# Patient Record
Sex: FEMALE | Race: OTHER
[De-identification: names, ages, dates, MRNs, and addresses within clinical notes are randomized per-mention and may not be internally consistent; named-entity substitution may affect disease eponyms.]

---

## 2020-01-16 ENCOUNTER — HOSPITAL ENCOUNTER (EMERGENCY)
Dept: HOSPITAL 62 - ER | Age: 25
LOS: 1 days | Discharge: HOME | End: 2020-01-17
Payer: MEDICAID

## 2020-01-16 DIAGNOSIS — Z83.3: ICD-10-CM

## 2020-01-16 DIAGNOSIS — E86.0: ICD-10-CM

## 2020-01-16 DIAGNOSIS — I10: Primary | ICD-10-CM

## 2020-01-16 DIAGNOSIS — R73.9: ICD-10-CM

## 2020-01-16 LAB
ADD MANUAL DIFF: NO
ALBUMIN SERPL-MCNC: 4.9 G/DL (ref 3.5–5)
ALP SERPL-CCNC: 75 U/L (ref 38–126)
ANION GAP SERPL CALC-SCNC: 10 MMOL/L (ref 5–19)
APPEARANCE UR: (no result)
APTT PPP: YELLOW S
AST SERPL-CCNC: 41 U/L (ref 14–36)
BASOPHILS # BLD AUTO: 0.1 10^3/UL (ref 0–0.2)
BASOPHILS NFR BLD AUTO: 1.3 % (ref 0–2)
BILIRUB DIRECT SERPL-MCNC: 0.2 MG/DL (ref 0–0.4)
BILIRUB SERPL-MCNC: 0.9 MG/DL (ref 0.2–1.3)
BILIRUB UR QL STRIP: NEGATIVE
BUN SERPL-MCNC: 17 MG/DL (ref 7–20)
CALCIUM: 10 MG/DL (ref 8.4–10.2)
CHLORIDE SERPL-SCNC: 102 MMOL/L (ref 98–107)
CO2 SERPL-SCNC: 30 MMOL/L (ref 22–30)
EOSINOPHIL # BLD AUTO: 0.1 10^3/UL (ref 0–0.6)
EOSINOPHIL NFR BLD AUTO: 2.5 % (ref 0–6)
ERYTHROCYTE [DISTWIDTH] IN BLOOD BY AUTOMATED COUNT: 12.7 % (ref 11.5–14)
GLUCOSE SERPL-MCNC: 121 MG/DL (ref 75–110)
GLUCOSE UR STRIP-MCNC: 50 MG/DL
HCT VFR BLD CALC: 41.2 % (ref 36–47)
HGB BLD-MCNC: 14.4 G/DL (ref 12–15.5)
KETONES UR STRIP-MCNC: 80 MG/DL
LYMPHOCYTES # BLD AUTO: 2.5 10^3/UL (ref 0.5–4.7)
LYMPHOCYTES NFR BLD AUTO: 47.8 % (ref 13–45)
MCH RBC QN AUTO: 30.3 PG (ref 27–33.4)
MCHC RBC AUTO-ENTMCNC: 35 G/DL (ref 32–36)
MCV RBC AUTO: 87 FL (ref 80–97)
MONOCYTES # BLD AUTO: 0.3 10^3/UL (ref 0.1–1.4)
MONOCYTES NFR BLD AUTO: 6.2 % (ref 3–13)
NEUTROPHILS # BLD AUTO: 2.2 10^3/UL (ref 1.7–8.2)
NEUTS SEG NFR BLD AUTO: 42.2 % (ref 42–78)
NITRITE UR QL STRIP: NEGATIVE
PH UR STRIP: 6 [PH] (ref 5–9)
PLATELET # BLD: 227 10^3/UL (ref 150–450)
POTASSIUM SERPL-SCNC: 3.2 MMOL/L (ref 3.6–5)
PROT SERPL-MCNC: 8.4 G/DL (ref 6.3–8.2)
PROT UR STRIP-MCNC: 100 MG/DL
RBC # BLD AUTO: 4.76 10^6/UL (ref 3.72–5.28)
SP GR UR STRIP: 1.03
TOTAL CELLS COUNTED % (AUTO): 100 %
UROBILINOGEN UR-MCNC: 2 MG/DL (ref ?–2)
WBC # BLD AUTO: 5.3 10^3/UL (ref 4–10.5)

## 2020-01-16 PROCEDURE — 96360 HYDRATION IV INFUSION INIT: CPT

## 2020-01-16 PROCEDURE — 82962 GLUCOSE BLOOD TEST: CPT

## 2020-01-16 PROCEDURE — 81001 URINALYSIS AUTO W/SCOPE: CPT

## 2020-01-16 PROCEDURE — 36415 COLL VENOUS BLD VENIPUNCTURE: CPT

## 2020-01-16 PROCEDURE — 85025 COMPLETE CBC W/AUTO DIFF WBC: CPT

## 2020-01-16 PROCEDURE — 93005 ELECTROCARDIOGRAM TRACING: CPT

## 2020-01-16 PROCEDURE — 83036 HEMOGLOBIN GLYCOSYLATED A1C: CPT

## 2020-01-16 PROCEDURE — 80053 COMPREHEN METABOLIC PANEL: CPT

## 2020-01-16 PROCEDURE — 93010 ELECTROCARDIOGRAM REPORT: CPT

## 2020-01-16 PROCEDURE — 81025 URINE PREGNANCY TEST: CPT

## 2020-01-16 PROCEDURE — 99283 EMERGENCY DEPT VISIT LOW MDM: CPT

## 2020-01-16 NOTE — ER DOCUMENT REPORT
ED Medical Screen (RME)





- General


Chief Complaint: High Blood Pressure


Stated Complaint: ABNORMAL LABS


Time Seen by Provider: 01/16/20 16:50


Mode of Arrival: Ambulatory


Information source: Patient


Notes: 





24-year-old female presents emergency department with complaints of high blood 

pressure.  Reports she is never had issues with it before.  She was at the 

health department getting birth control when they noted high blood pressure and 

told her to come here.  She denies chest pain.  Denies headache.








I have greeted and performed a rapid initial assessment of this patient.  A 

comprehensive ED assessment and evaluation of the patient, analysis of test 

results and completion of the medical decision making process will be conducted 

by additional ED providers.





- Related Data


Allergies/Adverse Reactions: 


                                        





No Known Allergies Allergy (Verified 01/16/20 16:49)


   











Past Medical History





- Social History


Frequency of alcohol use: None


Drug Abuse: None





Physical Exam





- Vital signs


Vitals: 





                                        











Temp Pulse Resp BP Pulse Ox


 


 98.5 F   54 L  16   208/116 H  98 


 


 01/16/20 16:42  01/16/20 16:42  01/16/20 16:42  01/16/20 16:42  01/16/20 16:42














Course





- Vital Signs


Vital signs: 





                                        











Temp Pulse Resp BP Pulse Ox


 


 98.5 F   54 L  16   208/116 H  98 


 


 01/16/20 16:42  01/16/20 16:42  01/16/20 16:42  01/16/20 16:42  01/16/20 16:42

## 2020-01-16 NOTE — EKG REPORT
SEVERITY:- ABNORMAL ECG -

SINUS RHYTHM

CONSIDER LEFT VENTRICULAR HYPERTROPHY

BORDERLINE PROLONGED QT INTERVAL

:

Confirmed by: Sheri Ramirez MD 16-Jan-2020 23:27:26

## 2020-01-16 NOTE — ER DOCUMENT REPORT
ED General





- General


Chief Complaint: High Blood Pressure


Stated Complaint: high blood pressure


Time Seen by Provider: 01/16/20 16:50


Primary Care Provider: 


[facility] [Provider Group] - Follow up as needed


[facility] [Provider Group] - Follow up as needed


[facility] [Provider Group] - Follow up as needed


[facility] [Provider Group] - Follow up as needed


Mode of Arrival: Ambulatory


Information source: Patient


Notes: 





24-year-old female presented to ED for complaint of elevated blood pressure at 

the health department when she went to get a Pap smear.  She states she was 

trying to get birth control and they noticed she had a high blood pressure and 

told her to come to the emergency room.  Patient denies any headaches chest pain

any other symptoms.  She states she did have a little bit of nausea and vomiting

last week but none this week.  She states she has been under a little stress.  

She does not smoke does not drink does not use drugs lives with relatives does 

not work and does not take any medications for any conditions.  She states at 

the health department her blood pressure was about 200/100 and something she is 

not sure.  When she first came to the emergency room blood pressure was 208/116 

on the automatic blood pressure cuff it is still saying 160/113 but when I do a 

manual blood pressure on the right it is 168/82.  On her labs it did show an 

elevated blood sugar of 126.  She states she does not have diabetes but her 

family does have diabetes.  Her urine did show ketones and sugar spilling.  Will

treat with a liter of fluids get a A1c and discharged home.





- HPI


Onset: This afternoon


Quality of pain: No pain


Severity: None


Pain Level: Denies


Associated symptoms: None


Exacerbated by: Denies


Relieved by: Denies


Similar symptoms previously: No


Recently seen / treated by doctor: Yes





- Related Data


Allergies/Adverse Reactions: 


                                        





No Known Allergies Allergy (Verified 01/16/20 16:49)


   











Past Medical History





- General


Information source: Patient





- Social History


Smoking Status: Never Smoker


Frequency of alcohol use: None


Drug Abuse: None


Lives with: Friend


Family History: Reviewed & Not Pertinent


Patient has suicidal ideation: No


Patient has homicidal ideation: No





- Past Medical History


Cardiac Medical History: Reports: Hx Hypertension


Pulmonary Medical History: Reports: None


EENT Medical History: Reports: None


Neurological Medical History: Reports: None


Endocrine Medical History: Reports: None


Renal/ Medical History: Reports: None


Malignancy Medical History: Reports: None


GI Medical History: Reports: None


Musculoskeletal Medical History: Reports None


Skin Medical History: Reports None


Psychiatric Medical History: Reports: None


Traumatic Medical History: Reports: None


Infectious Medical History: Reports: None


Surgical Hx: Negative


Past Surgical History: Reports: None





- Immunizations


Immunizations up to date: Yes





Review of Systems





- Review of Systems


Constitutional: No symptoms reported


EENT: No symptoms reported


Cardiovascular: Other - High blood pressure


Respiratory: No symptoms reported


Gastrointestinal: No symptoms reported


Genitourinary: No symptoms reported


Female Genitourinary: No symptoms reported


Musculoskeletal: No symptoms reported


Skin: No symptoms reported


Hematologic/Lymphatic: No symptoms reported


Neurological/Psychological: No symptoms reported


-: Yes All other systems reviewed and negative





Physical Exam





- Vital signs


Vitals: 


                                        











Temp Pulse Resp BP Pulse Ox


 


 98.5 F   54 L  16   208/116 H  98 


 


 01/16/20 16:42  01/16/20 16:42  01/16/20 16:42  01/16/20 16:42  01/16/20 16:42











Interpretation: Normal





- General


General appearance: Appears well, Alert





- HEENT


Head: Normocephalic, Atraumatic


Eyes: Normal


Pupils: PERRL





- Respiratory


Respiratory status: No respiratory distress


Chest status: Nontender


Breath sounds: Normal


Chest palpation: Normal





- Cardiovascular


Rhythm: Regular


Heart sounds: Normal auscultation


Murmur: No





- Abdominal


Inspection: Normal


Distension: No distension


Bowel sounds: Normal


Tenderness: Nontender


Organomegaly: No organomegaly





- Back


Back: Normal, Nontender





- Extremities


General upper extremity: Normal inspection, Nontender, Normal color, Normal ROM,

Normal temperature


General lower extremity: Normal inspection, Nontender, Normal color, Normal ROM,

Normal temperature, Normal weight bearing.  No: Leonel's sign





- Neurological


Neuro grossly intact: Yes


Cognition: Normal


Orientation: AAOx4


Yana Coma Scale Eye Opening: Spontaneous


Hagaman Coma Scale Verbal: Oriented


Hagaman Coma Scale Motor: Obeys Commands


Yana Coma Scale Total: 15


Speech: Normal


Motor strength normal: LUE, RUE, LLE, RLE


Sensory: Normal





- Psychological


Associated symptoms: Normal affect, Normal mood





- Skin


Skin Temperature: Warm


Skin Moisture: Dry


Skin Color: Normal





Course





- Re-evaluation


Re-evalutation: 





01/17/20 00:04


Discussed labs with patient and written report of labs given to patient to 

follow-up with primary care.  Patient was started on amlodipine and 

hydrochlorothiazide for her blood pressure.  Patient was given potassium in the 

ED for her low potassium she was given some fluids for her mild dehydration.  

She was instructed to drink fluids decrease the fat in her diet and follow-up 

with a primary doctor within the next week to have blood pressure rechecked and 

adjust medications as needed.  Patient verbalized understanding and agreement 

with treatment plan and patient was discharged home.








- Vital Signs


Vital signs: 


                                        











Temp Pulse Resp BP Pulse Ox


 


 98.5 F   54 L  15   164/100 H  97 


 


 01/16/20 16:42  01/16/20 16:42  01/17/20 01:00  01/17/20 01:04  01/17/20 01:00














- Laboratory


Result Diagrams: 


                                 01/16/20 17:26





                                 01/16/20 17:26


Laboratory results interpreted by me: 


                                        











  01/16/20 01/16/20 01/16/20





  17:26 17:26 17:26


 


Lymph % (Auto)  47.8 H  


 


Potassium   3.2 L 


 


Glucose   121 H 


 


AST   41 H 


 


Total Protein   8.4 H 


 


Urine Protein    100 H


 


Urine Glucose (UA)    50 H


 


Urine Ketones    80 H


 


Urine Urobilinogen    2.0 H














Discharge





- Discharge


Clinical Impression: 


High blood pressure


Qualifiers:


 Hypertension type: unspecified Qualified Code(s): I10 - Essential (primary) 

hypertension





Condition: Stable


Disposition: HOME, SELF-CARE


Additional Instructions: 


HIGH BLOOD PRESSURE REQUIRING TREATMENT:





     Your blood pressure is high.  This is called "hypertension." Today's 

reading was __208/116___________ (normal is less than 140/90). Your history and 

exam suggest that this is not a temporary problem. You need treatment of your 

blood pressure.


     If left untreated, high blood pressure greatly increases your risk of heart

attack and stroke.  Please don't ignore this problem. If you have blood pressure

medicine but aren't using it regularly, start taking it again. 


     Some simple things you can do to help are: Get some aerobic exercise for at

least 20 minutes on a daily basis. (See your doctor before beginning any new 

exercise program.)  Eat a low-fat diet. Lose excess weight.  Avoid salty foods 

and avoid adding salt to any of the foods you eat.  Avoid diet pills, 

decongestants, "energizing" herbs, and other medicines that elevate blood 

pressure.


     There are many different medicines that treat blood pressure.  If your 

medication causes unpleasant side effects, call your doctor. There are others 

you can try.  Treating hypertension is a life-long investment in your health.





HYDROCHLOROTHIAZIDE:


     Hydrochlorothiazide is a diuretic medication.  Diuretics are often called 

"water pills."  The medicine flushes excess salt and water from the body.  

Diuretics are used for fluid retention (such as heart failure, cirrhosis, or 

lung disease) and for blood pressure control. Often hydrochlorothiazide is 

combined with other medicines in the same pill.


     Most patients prefer to take the medicine in the morning. Hydrochlorothi

azide makes extra urine, which can be a problem if you take the pill at night.


     Diuretics make you lose potassium.  Sometimes a good diet with plenty of 

fruit is enough to replace it.  Sometimes a potassium supplement is necessary.  

Or, hydrochlorothiazide may be combined with medicines that prevent potassium 

loss.  We usually recommend a blood potassium test in a few weeks.


     Contact your doctor if you develop extreme fatigue, muscle weakness, 

lethargy, confusion, or palpitations.








CALCIUM CHANNEL BLOCKERS:


     A medication of the calcium channel blocker type has been prescribed for 

you.  Examples of this type of medicine are Calan, Isoptin, Procardia, and 

Cardizem.


     These medicines have a variety of uses, including prevention of angina 

attacks, treatment of blood pressure, regulation of certain heart rhythm 

problems, and prevention of migraine headaches.


     Calcium channel blockers work by interfering with the flow of calcium in 

cell membranes.  This results in dilation of blood vessels, and slowing of 

electrical conduction in the heart.


     A slight dizziness (due to a fall in blood pressure) may occur with the 

first dose, and sometimes even with later doses.  This may make you prone to 

dizziness if you stand up suddenly.


     Call the doctor if lightheadedness is severe, or if you develop 

palpitations, shortness of breath, or any other new or alarming symptoms.





I have given you a prescription for 2 weeks worth of blood pressure medicine.  

Please follow-up with your primary care doctor within the next week to be 

reexamined and to get further blood pressure medicines.  Your blood pressure 

needs to be monitored to ensure that this treatment will work for you or if you 

need more or less medication.  Your blood sugar was a little elevated in the 

emergency room but your A1c which is your average sugar was 5.1 so you will need

to follow-up to monitor the blood sugar.








FOLLOW-UP CARE:


If you have been referred to a physician for follow-up care, call the 

physicians office for an appointment as you were instructed or within the next 

two days.  If you experience worsening or a significant change in your symptoms,

notify the physician immediately or return to the Emergency Department at any 

time for re-evaluation.





Prescriptions: 


Hydrochlorothiazide [Hydrodiuril 12.5 mg Tablet] 12.5 mg PO QAM #14 capsule


Amlodipine Besylate [Norvasc 5 mg Tablet] 5 mg PO DAILY #14 tablet


Forms:  Elevated Blood Pressure, Return to Work


Referrals: 


[facility] [Provider Group] - Follow up as needed


[facility] [Provider Group] - Follow up as needed


[facility] [Provider Group] - Follow up as needed


[facility] [Provider Group] - Follow up as needed

## 2020-01-17 VITALS — SYSTOLIC BLOOD PRESSURE: 164 MMHG | DIASTOLIC BLOOD PRESSURE: 100 MMHG
